# Patient Record
Sex: FEMALE | Race: WHITE | NOT HISPANIC OR LATINO | URBAN - METROPOLITAN AREA
[De-identification: names, ages, dates, MRNs, and addresses within clinical notes are randomized per-mention and may not be internally consistent; named-entity substitution may affect disease eponyms.]

---

## 2017-04-05 PROBLEM — 14760008 CONSTIPATION: Status: ACTIVE | Noted: 2017-04-05

## 2017-04-27 PROBLEM — 72007001 DUODENITIS: Status: ACTIVE | Noted: 2017-04-27

## 2017-04-27 PROBLEM — 249504006 FLATULENCE: Status: ACTIVE | Noted: 2017-04-27

## 2017-04-27 PROBLEM — 196731005 GASTRODUODENITIS: Status: ACTIVE | Noted: 2017-04-27

## 2021-02-23 ENCOUNTER — OFFICE VISIT (OUTPATIENT)
Dept: URBAN - METROPOLITAN AREA CLINIC 31 | Facility: CLINIC | Age: 61
End: 2021-02-23

## 2021-02-23 ENCOUNTER — TELEPHONE ENCOUNTER (OUTPATIENT)
Dept: URBAN - METROPOLITAN AREA CLINIC 35 | Facility: CLINIC | Age: 61
End: 2021-02-23

## 2021-02-23 ENCOUNTER — LAB OUTSIDE AN ENCOUNTER (OUTPATIENT)
Dept: URBAN - METROPOLITAN AREA SURGERY CENTER 8 | Facility: SURGERY CENTER | Age: 61
End: 2021-02-23

## 2021-02-23 VITALS — HEIGHT: 65 IN | BODY MASS INDEX: 21.49 KG/M2 | WEIGHT: 129 LBS

## 2021-02-23 RX ORDER — OMEPRAZOLE MAGNESIUM 20 MG/1
1 CAPSULE CAPSULE, DELAYED RELEASE ORAL ONCE A DAY
Status: DISCONTINUED | COMMUNITY

## 2021-02-23 RX ORDER — MULTIVIT WITH CALCIUM,IRON,MIN 27MG-0.4MG
TABLET ORAL
Status: DISCONTINUED | COMMUNITY

## 2021-02-23 RX ORDER — SODIUM PICOSULFATE, MAGNESIUM OXIDE, AND ANHYDROUS CITRIC ACID 10; 3.5; 12 MG/160ML; G/160ML; G/160ML
AS DIRECTED FOR SPLIT DOSE LIQUID ORAL
Qty: 1 BOX | Refills: 0 | OUTPATIENT
Start: 2021-02-23

## 2021-02-23 RX ORDER — POLYETHYLENE GLYCOL 3350 17 G/17G
POWDER, FOR SOLUTION ORAL
Status: ACTIVE | COMMUNITY

## 2021-02-23 RX ORDER — SODIUM PICOSULFATE, MAGNESIUM OXIDE, AND ANHYDROUS CITRIC ACID 10; 3.5; 12 MG/160ML; G/160ML; G/160ML
ML LIQUID ORAL ONCE
Qty: 1 KIT | Refills: 0 | OUTPATIENT
Start: 2021-02-23

## 2021-02-23 NOTE — EXAM-MIGRATED EXAMINATIONS
GENERAL APPEARANCE: - alert, in no acute distress, well developed, well nourished;   ORAL CAVITY: - mucosa moist.  MP 2;

## 2021-02-23 NOTE — HPI-MIGRATED HPI
;     Colorectal Cancer Screening : Patient presents today to discuss getting a repeat colonoscopy. Her last colonoscopy was done in 2011 with Dr. Dunbar. Patient has  no known family history of colon cancer. She is currently having 1 bowel movement per day with normal and formed stools. Patient denies any blood or mucous in her stool. She denies melena. Patient denies heartburn. She used to have issues with heartburn but states that heartburn has not been an issue in a while. Patient denies any prior complications with anesthesia, sleep apnea, dialysis, home oxygen use, or spinal cord injuries. She denies any shortness of breath.;

## 2021-03-31 ENCOUNTER — TELEPHONE ENCOUNTER (OUTPATIENT)
Dept: URBAN - METROPOLITAN AREA CLINIC 35 | Facility: CLINIC | Age: 61
End: 2021-03-31

## 2021-04-20 ENCOUNTER — TELEPHONE ENCOUNTER (OUTPATIENT)
Dept: URBAN - METROPOLITAN AREA CLINIC 35 | Facility: CLINIC | Age: 61
End: 2021-04-20

## 2021-04-20 ENCOUNTER — OFFICE VISIT (OUTPATIENT)
Dept: URBAN - METROPOLITAN AREA CLINIC 31 | Facility: CLINIC | Age: 61
End: 2021-04-20

## 2021-04-20 VITALS — HEIGHT: 65 IN | BODY MASS INDEX: 21.83 KG/M2 | WEIGHT: 131 LBS

## 2021-04-20 RX ORDER — POLYETHYLENE GLYCOL 3350 17 G/17G
POWDER, FOR SOLUTION ORAL
Status: DISCONTINUED | COMMUNITY

## 2021-04-20 RX ORDER — SODIUM PICOSULFATE, MAGNESIUM OXIDE, AND ANHYDROUS CITRIC ACID 10; 3.5; 12 MG/160ML; G/160ML; G/160ML
ML LIQUID ORAL ONCE
Qty: 1 KIT | Refills: 0 | Status: ACTIVE | COMMUNITY
Start: 2021-02-23

## 2021-04-20 NOTE — HPI-MIGRATED HPI
;     Abdominal Pain : Patient presents today for an office visit to discuss abdominal pain. Her colonoscopy is scheduled for this Thursday. Patient states that she began having abdominal pain around the end of March. Patient states that the abdominal pain is located in her RLQ and is described as "sore". The pain is worse when patient "sits down or stands up from a sitting position, like when she gets out of her car". She had an ultrasound done with her PCP and per patient, this was normal. The ultrasound report is unavailable for review. The abdominal pain is intermittent. Patient states that the abdominal pain is actually much better and has improved. Patient denies any issues with her bowels, nausea or vomiting. She has not run a fever. She states that she decided to keep today's appointment just to be sure (with her colonoscopy scheduled for 04/22/2021), that it is okay to proceed with the procedure. ;

## 2021-04-22 ENCOUNTER — OFFICE VISIT (OUTPATIENT)
Dept: URBAN - METROPOLITAN AREA SURGERY CENTER 8 | Facility: SURGERY CENTER | Age: 61
End: 2021-04-22

## 2021-05-05 ENCOUNTER — OFFICE VISIT (OUTPATIENT)
Dept: URBAN - METROPOLITAN AREA CLINIC 31 | Facility: CLINIC | Age: 61
End: 2021-05-05

## 2021-05-19 ENCOUNTER — OFFICE VISIT (OUTPATIENT)
Dept: URBAN - METROPOLITAN AREA CLINIC 31 | Facility: CLINIC | Age: 61
End: 2021-05-19

## 2021-05-19 VITALS — BODY MASS INDEX: 22.16 KG/M2 | HEIGHT: 65 IN | WEIGHT: 133 LBS

## 2021-05-19 PROBLEM — 398050005 DIVERTICULAR DISEASE OF COLON: Status: ACTIVE | Noted: 2021-05-19

## 2021-05-19 RX ORDER — SODIUM PICOSULFATE, MAGNESIUM OXIDE, AND ANHYDROUS CITRIC ACID 10; 3.5; 12 MG/160ML; G/160ML; G/160ML
ML LIQUID ORAL ONCE
Qty: 1 KIT | Refills: 0 | Status: DISCONTINUED | COMMUNITY
Start: 2021-02-23

## 2021-05-19 NOTE — HPI-MIGRATED HPI
;     Colonoscopy Follow-Up : Patient presents today for a follow-up after the colonoscopy that was done on 04/22/2021. Since the procedure patient states that she is having 1 bowel movement per day (sometimes every other day) with normal and formed stools. Patient denies seeing any blood or mucous in her stool. Patient denies melena. Patient states that she had no complications following her colonoscopy. The abdominal pain that she was having has improved.     ;

## 2024-03-18 ENCOUNTER — OV NP (OUTPATIENT)
Dept: URBAN - METROPOLITAN AREA CLINIC 33 | Facility: CLINIC | Age: 64
End: 2024-03-18

## 2024-03-18 VITALS
SYSTOLIC BLOOD PRESSURE: 114 MMHG | HEART RATE: 75 BPM | OXYGEN SATURATION: 97 % | HEIGHT: 65 IN | DIASTOLIC BLOOD PRESSURE: 75 MMHG | BODY MASS INDEX: 21.83 KG/M2 | WEIGHT: 131 LBS

## 2024-03-18 DIAGNOSIS — R10.32 LEFT LOWER QUADRANT ABDOMINAL PAIN: ICD-10-CM

## 2024-03-18 DIAGNOSIS — Z86.010 PERSONAL HISTORY OF COLONIC POLYPS: ICD-10-CM

## 2024-03-18 DIAGNOSIS — K57.30 SIGMOID DIVERTICULOSIS: ICD-10-CM

## 2024-03-18 PROBLEM — 428283002: Status: ACTIVE | Noted: 2024-03-18

## 2024-03-18 PROBLEM — 397881000: Status: ACTIVE | Noted: 2024-03-18

## 2024-03-18 PROBLEM — 429430001: Status: ACTIVE | Noted: 2024-03-18

## 2024-03-18 PROCEDURE — 99214 OFFICE O/P EST MOD 30 MIN: CPT | Performed by: INTERNAL MEDICINE

## 2024-03-18 NOTE — HPI-ABDOMINAL PAIN
63 year old female patient presents for abdominal pain consult. Patient admits she has been experiencing symptoms since 6 months ago. Patient describes symptoms as discomfort. Patient states symptoms are located lower left abdomen.  Patient admits symptoms are more present during random times comes on and off. Patient denies nausea or vomiting.  Of note: Patient still has some gas issues from the past.   Patient admits having any recent imaging. CT scan: Impression Mild hepatology at 17cm  Democrative changes in the spine with mild lumbar scoliosis.   Patient denies EGD in the past.  Recent labs completed 09/2023.

## 2024-03-20 ENCOUNTER — OV NP (OUTPATIENT)
Dept: URBAN - METROPOLITAN AREA CLINIC 35 | Facility: CLINIC | Age: 64
End: 2024-03-20

## 2024-04-26 ENCOUNTER — OV EP (OUTPATIENT)
Dept: URBAN - METROPOLITAN AREA CLINIC 35 | Facility: CLINIC | Age: 64
End: 2024-04-26

## 2024-06-07 ENCOUNTER — OFFICE VISIT (OUTPATIENT)
Dept: URBAN - METROPOLITAN AREA CLINIC 35 | Facility: CLINIC | Age: 64
End: 2024-06-07